# Patient Record
Sex: MALE | Race: WHITE
[De-identification: names, ages, dates, MRNs, and addresses within clinical notes are randomized per-mention and may not be internally consistent; named-entity substitution may affect disease eponyms.]

---

## 2019-04-02 PROBLEM — Z00.00 ENCOUNTER FOR PREVENTIVE HEALTH EXAMINATION: Status: ACTIVE | Noted: 2019-04-02

## 2019-04-04 ENCOUNTER — APPOINTMENT (OUTPATIENT)
Dept: VASCULAR SURGERY | Facility: CLINIC | Age: 74
End: 2019-04-04
Payer: MEDICARE

## 2019-04-04 ENCOUNTER — LABORATORY RESULT (OUTPATIENT)
Age: 74
End: 2019-04-04

## 2019-04-04 VITALS
DIASTOLIC BLOOD PRESSURE: 95 MMHG | SYSTOLIC BLOOD PRESSURE: 179 MMHG | HEIGHT: 70 IN | TEMPERATURE: 98.9 F | WEIGHT: 202 LBS | HEART RATE: 59 BPM | BODY MASS INDEX: 28.92 KG/M2

## 2019-04-04 VITALS — SYSTOLIC BLOOD PRESSURE: 187 MMHG | HEART RATE: 60 BPM | DIASTOLIC BLOOD PRESSURE: 89 MMHG

## 2019-04-04 DIAGNOSIS — I72.4 ANEURYSM OF ARTERY OF LOWER EXTREMITY: ICD-10-CM

## 2019-04-04 DIAGNOSIS — I71.03 DISSECTION OF THORACOABDOMINAL AORTA: ICD-10-CM

## 2019-04-04 PROCEDURE — 99203 OFFICE O/P NEW LOW 30 MIN: CPT

## 2019-05-23 ENCOUNTER — APPOINTMENT (OUTPATIENT)
Dept: UROLOGY | Facility: CLINIC | Age: 74
End: 2019-05-23

## 2019-06-12 ENCOUNTER — APPOINTMENT (OUTPATIENT)
Dept: UROLOGY | Facility: CLINIC | Age: 74
End: 2019-06-12
Payer: MEDICARE

## 2019-06-12 VITALS
SYSTOLIC BLOOD PRESSURE: 126 MMHG | DIASTOLIC BLOOD PRESSURE: 68 MMHG | HEART RATE: 70 BPM | HEIGHT: 70 IN | WEIGHT: 202 LBS | RESPIRATION RATE: 17 BRPM | BODY MASS INDEX: 28.92 KG/M2

## 2019-06-12 DIAGNOSIS — N28.1 CYST OF KIDNEY, ACQUIRED: ICD-10-CM

## 2019-06-12 DIAGNOSIS — Z86.79 PERSONAL HISTORY OF OTHER DISEASES OF THE CIRCULATORY SYSTEM: ICD-10-CM

## 2019-06-12 DIAGNOSIS — Z86.718 PERSONAL HISTORY OF OTHER VENOUS THROMBOSIS AND EMBOLISM: ICD-10-CM

## 2019-06-12 PROCEDURE — 99204 OFFICE O/P NEW MOD 45 MIN: CPT

## 2019-06-25 PROBLEM — N28.1 BILATERAL RENAL CYSTS: Status: ACTIVE | Noted: 2019-06-12

## 2019-06-25 RX ORDER — CARVEDILOL 25 MG/1
25 TABLET, FILM COATED ORAL
Refills: 0 | Status: ACTIVE | COMMUNITY

## 2019-06-25 RX ORDER — RIVAROXABAN 20 MG/1
20 TABLET, FILM COATED ORAL
Refills: 0 | Status: ACTIVE | COMMUNITY

## 2019-06-25 RX ORDER — AMLODIPINE BESYLATE 10 MG/1
10 TABLET ORAL
Refills: 0 | Status: ACTIVE | COMMUNITY

## 2019-06-25 RX ORDER — ACETAMINOPHEN 500 MG/1
TABLET ORAL
Refills: 0 | Status: ACTIVE | COMMUNITY

## 2019-06-25 RX ORDER — ROSUVASTATIN CALCIUM 20 MG/1
20 TABLET, FILM COATED ORAL
Refills: 0 | Status: ACTIVE | COMMUNITY

## 2019-06-25 RX ORDER — IPRATROPIUM BROMIDE AND ALBUTEROL SULFATE .5; 3 MG/3ML; MG/3ML
SOLUTION RESPIRATORY (INHALATION)
Refills: 0 | Status: ACTIVE | COMMUNITY

## 2019-06-25 RX ORDER — ALBUTEROL 90 MCG
90 AEROSOL (GRAM) INHALATION
Refills: 0 | Status: ACTIVE | COMMUNITY

## 2019-06-25 RX ORDER — IRON SUCROSE 20 MG/ML
INJECTION, SOLUTION INTRAVENOUS
Refills: 0 | Status: ACTIVE | COMMUNITY

## 2019-06-25 RX ORDER — LISINOPRIL 5 MG/1
5 TABLET ORAL
Refills: 0 | Status: ACTIVE | COMMUNITY

## 2019-06-25 RX ORDER — FAMOTIDINE 10 MG/1
TABLET, FILM COATED ORAL
Refills: 0 | Status: ACTIVE | COMMUNITY

## 2019-06-25 NOTE — PHYSICAL EXAM
[General Appearance - Well Developed] : well developed [General Appearance - Well Nourished] : well nourished [Normal Appearance] : normal appearance [Well Groomed] : well groomed [Edema] : no peripheral edema [General Appearance - In No Acute Distress] : no acute distress [Respiration, Rhythm And Depth] : normal respiratory rhythm and effort [Exaggerated Use Of Accessory Muscles For Inspiration] : no accessory muscle use [Abdomen Tenderness] : non-tender [Abdomen Soft] : soft [Costovertebral Angle Tenderness] : no ~M costovertebral angle tenderness [Urethral Meatus] : meatus normal [Urinary Bladder Findings] : the bladder was normal on palpation [Scrotum] : the scrotum was normal [Testes Mass (___cm)] : there were no testicular masses [Normal Station and Gait] : the gait and station were normal for the patient's age [] : no rash [No Focal Deficits] : no focal deficits [Oriented To Time, Place, And Person] : oriented to person, place, and time [Affect] : the affect was normal [Mood] : the mood was normal [No Palpable Adenopathy] : no palpable adenopathy [Not Anxious] : not anxious

## 2019-06-25 NOTE — HISTORY OF PRESENT ILLNESS
[FreeTextEntry1] : cc renal cysts\par 74 yo male referred for eval of renal cysts \par no flank pain \par no signifcant luts \par no fam h/o renal ca\par \par ct 5/19\par There is cortical atrophy involving the right kidney. The left kidney appears normal. Large cysts\par are seen in both kidneys, measuring up to 6.5 cm in maximum dimension. There is no hydronephrosis.

## 2019-06-25 NOTE — REVIEW OF SYSTEMS
[see HPI] : see HPI [Wake up at night to urinate  How many times?  ___] : wakes up to urinate [unfilled] times during the night [Dry Eyes] : dryness of the eyes [Earache] : earache [Chest Pain] : chest pain [Palpitations] : palpitations [Shortness Of Breath] : shortness of breath [Abdominal Pain] : abdominal pain [Cough] : cough [Constipation] : constipation [Heartburn] : heartburn [Loss of interest] : loss of interest in sexual activity [Poor quality erections] : Poor quality erections [Skin Wound] : skin wound [Itching] : itching [Dizziness] : dizziness [Limb Weakness] : limb weakness [Difficulty Walking] : difficulty walking [Anxiety] : anxiety [Muscle Weakness] : muscle weakness [Feelings Of Weakness] : feelings of weakness [Easy Bleeding] : a tendency for easy bleeding [Easy Bruising] : a tendency for easy bruising [Negative] : Constitutional [Heart Rate Is Slow] : the heart rate was not slow

## 2019-06-25 NOTE — ASSESSMENT
[FreeTextEntry1] : ct reviewed \par bosniak classification reviewed\par no intervention for simple cyst

## 2021-03-24 ENCOUNTER — APPOINTMENT (OUTPATIENT)
Dept: PULMONOLOGY | Facility: CLINIC | Age: 76
End: 2021-03-24
Payer: MEDICARE

## 2021-03-24 VITALS
WEIGHT: 230 LBS | DIASTOLIC BLOOD PRESSURE: 76 MMHG | HEIGHT: 67 IN | TEMPERATURE: 97.1 F | OXYGEN SATURATION: 98 % | SYSTOLIC BLOOD PRESSURE: 136 MMHG | HEART RATE: 67 BPM | BODY MASS INDEX: 36.1 KG/M2

## 2021-03-24 PROCEDURE — 99072 ADDL SUPL MATRL&STAF TM PHE: CPT

## 2021-03-24 PROCEDURE — 99204 OFFICE O/P NEW MOD 45 MIN: CPT

## 2021-03-24 RX ORDER — LISINOPRIL 30 MG/1
TABLET ORAL
Refills: 0 | Status: DISCONTINUED | COMMUNITY
End: 2021-03-24

## 2021-03-24 RX ORDER — ROSUVASTATIN CALCIUM 5 MG/1
TABLET, FILM COATED ORAL
Refills: 0 | Status: DISCONTINUED | COMMUNITY
End: 2021-03-24

## 2021-03-24 RX ORDER — CARVEDILOL 25 MG/1
25 TABLET, FILM COATED ORAL
Refills: 0 | Status: DISCONTINUED | COMMUNITY
End: 2021-03-24

## 2021-03-24 RX ORDER — CARVEDILOL 3.12 MG/1
TABLET, FILM COATED ORAL
Refills: 0 | Status: DISCONTINUED | COMMUNITY
End: 2021-03-24

## 2021-03-24 NOTE — HISTORY OF PRESENT ILLNESS
[Former] : former [TextBox_4] : He is 75-year-old man. He presented with dyspnea on exertion. He gets short of breath when he walks about one block. Denied any exertional chest pain, pressure or palpitations. Denies any coughing or wheezing. No constitutional complaints. He stopped smoking over 40 years ago. [YearQuit] : 1980

## 2021-03-24 NOTE — PHYSICAL EXAM
[No Acute Distress] : no acute distress [No Neck Mass] : no neck mass [Normal S1, S2] : normal s1, s2 [Clear to Auscultation Bilaterally] : clear to auscultation bilaterally [No Abnormalities] : no abnormalities [No HSM] : no hsm [Normal Gait] : normal gait [No Clubbing] : no clubbing [No Focal Deficits] : no focal deficits [Oriented x3] : oriented x3

## 2021-03-24 NOTE — REVIEW OF SYSTEMS
[SOB on Exertion] : sob on exertion [Edema] : edema [Postnasal Drip] : no postnasal drip [Cough] : no cough [Wheezing] : no wheezing [Chest Discomfort] : no chest discomfort [Palpitations] : no palpitations [Nasal Discharge] : no nasal discharge [GERD] : no gerd [Myalgias] : no myalgias [Headache] : no headache [Depression] : no depression [Diabetes] : no diabetes

## 2021-03-24 NOTE — DISCUSSION/SUMMARY
[FreeTextEntry1] : He is 75-year-old man. He presented with dyspnea on exertion. He gets short of breath when he walks about one block. Denied any exertional chest pain, pressure or palpitations. Denies any coughing or wheezing. No constitutional complaints. He stopped smoking over 40 years ago.\par \par There is no evidence of any active pulmonary disease at this time. A PFT will be obtained after a nasopharyngeal swab for COVID-19-PCR has been obtained and the result is negative. Further recommendations to follow the results of the above. 
(2) chairfast

## 2021-03-26 DIAGNOSIS — Z01.818 ENCOUNTER FOR OTHER PREPROCEDURAL EXAMINATION: ICD-10-CM

## 2021-03-27 ENCOUNTER — APPOINTMENT (OUTPATIENT)
Dept: DISASTER EMERGENCY | Facility: CLINIC | Age: 76
End: 2021-03-27

## 2021-03-29 LAB — SARS-COV-2 N GENE NPH QL NAA+PROBE: NOT DETECTED

## 2021-03-31 ENCOUNTER — APPOINTMENT (OUTPATIENT)
Dept: PULMONOLOGY | Facility: CLINIC | Age: 76
End: 2021-03-31
Payer: MEDICARE

## 2021-03-31 DIAGNOSIS — R06.02 SHORTNESS OF BREATH: ICD-10-CM

## 2021-03-31 PROCEDURE — 99072 ADDL SUPL MATRL&STAF TM PHE: CPT

## 2021-03-31 PROCEDURE — 94727 GAS DIL/WSHOT DETER LNG VOL: CPT

## 2021-03-31 PROCEDURE — 94729 DIFFUSING CAPACITY: CPT

## 2021-03-31 PROCEDURE — 94060 EVALUATION OF WHEEZING: CPT

## 2023-07-07 ENCOUNTER — APPOINTMENT (OUTPATIENT)
Dept: NEUROSURGERY | Facility: CLINIC | Age: 78
End: 2023-07-07

## 2024-12-25 PROBLEM — F10.90 ALCOHOL USE: Status: INACTIVE | Noted: 2019-06-12

## 2025-06-03 ENCOUNTER — APPOINTMENT (OUTPATIENT)
Dept: OTOLARYNGOLOGY | Facility: CLINIC | Age: 80
End: 2025-06-03